# Patient Record
Sex: FEMALE | Race: WHITE | Employment: FULL TIME | URBAN - METROPOLITAN AREA
[De-identification: names, ages, dates, MRNs, and addresses within clinical notes are randomized per-mention and may not be internally consistent; named-entity substitution may affect disease eponyms.]

---

## 2022-01-20 ENCOUNTER — HOSPITAL ENCOUNTER (OUTPATIENT)
Dept: MAMMOGRAPHY | Facility: CLINIC | Age: 62
End: 2022-01-20
Attending: SPECIALIST
Payer: COMMERCIAL

## 2022-01-20 DIAGNOSIS — N64.89 SEROMA OF BREAST: ICD-10-CM

## 2022-01-20 PROCEDURE — 88305 TISSUE EXAM BY PATHOLOGIST: CPT | Mod: TC | Performed by: SPECIALIST

## 2022-01-20 PROCEDURE — 250N000009 HC RX 250: Performed by: SPECIALIST

## 2022-01-20 PROCEDURE — 10030 IMG GID FLU COLL DRG SFT TIS: CPT

## 2022-01-20 PROCEDURE — 76642 ULTRASOUND BREAST LIMITED: CPT | Mod: LT

## 2022-01-20 RX ADMIN — LIDOCAINE HYDROCHLORIDE 5 ML: 10 INJECTION, SOLUTION INFILTRATION; PERINEURAL at 14:14

## 2022-01-20 NOTE — DISCHARGE INSTRUCTIONS
"  Caring for a Closed Suction Drainage Tube  A drainage tube removes fluid from your Breast. This helps prevent infection and promotes healing. The collection bulb at the end of the tube is squeezed and plugged to create suction. The bulb should be emptied and reset when half full to maintain adequate suction. You need to empty the bulb and clean the skin around the drain as often as your healthcare provider tells you to. Follow the steps you were taught in the hospital.        Supplies  Have the following items ready:    Disposable gloves    Measuring cup    Record sheet    Gauze or paper towel    Sterile cotton swabs or 4\" x 4\" gauze pads    Sterile saline or soap and water  When to call your healthcare provider  Call your healthcare provider if you notice any of these changes:    The amount of fluid increases or decreases suddenly    Large amount of blood or a clot in drainage    Color, odor, or thickness of the fluid changes    Tube falls out or the incision opens    The stitch that holds the drain in place falls out, or is no longer attached to the drain.    Skin around the drain is red, swollen, painful, or seeping pus    You have a fever of 100.4 F ( 38 C ) or higher, or as directed by your healthcare provider  Tips  Here are tips to drain the tube:    Uncurl any kinks in the tube.    With one hand, firmly hold the base of the tube between your thumb and index finger. Don't touch the incision.    Put the thumb and index finger of your other hand on the tube, next to the first hand. Pinch your fingers together. Then pull them along the tube toward the bag. This will help push any clogged fluid through the tube. This is called \"stripping the tube.\" You may find it helpful to hold an alcohol swab between your fingers and the tube to lubricate the tubing.    If the tube still does not drain, call your healthcare provider.  [employer] last reviewed this educational content on 9/1/2019 2000-2021 [employer]. All rights reserved. This information is not intended as a substitute for professional medical care. Always follow your healthcare professional's instructions.            Discharge Instructions: Caring for Your Amadeo-Avila Drainage Tube  Your healthcare provider discharged you with a Amadeo-Avila drainage tube. They commonly leave this drain within the abdomen and other cavities after surgery. It helps drain and collect blood and body fluid after surgery. This can prevent swelling and reduces the risk for infection. The tube is held in place by a few stitches. It's covered with a bandage. Your healthcare provider will remove the drain when he or she determines you no longer need it.  Home care    Don t sleep on the same side as the tube.    Secure the tube and bag inside your clothing with a safety pin. This helps keep the tube from being pulled out.    Empty your drain at least twice a day. Empty it more often if the drain is full. Wash  and dry your hands before emptying the drain.  ? Lift the opening on the drain.  ? Drain the fluid into a measuring cup.  ? Record the amount of fluid each time you empty the drain. Include the date and time it was emptied. Share this information with your healthcare provider on your next visit.  ? Squeeze the bulb with your hands until you hear air coming out of the bulb if your healthcare provider has instructed you to do so (sometimes the bulb is used as a reservoir without suction). Check with your healthcare provider about specific drain instructions.  ? Close the opening.    If you are to change the dressing around the tube, follow the directions your provider has given you. Some dressings may not need to be changed, but your provider will let you know. Here are some general steps to follow:  ? Wash your hands.  ? Remove the old bandage.  ? Wash your hands again.  ? Clean the skin around the incision and tube site as instructed.  ? Put a new bandage on the  incision and tube site. Make the bandage large enough to cover the whole incision area.  ? Tape the bandage in place.    Keep the bandage and tube site dry when you shower.    Follow-up care  Make a follow-up appointment as directed by our staff.  When to call your healthcare provider  Call your healthcare provider right away if you have any of the following:    New or increased pain around the tube    Redness, swelling, or warmth around the incision or tube    Drainage that is foul-smelling    Vomiting    Fever of 100.4 F ( 38 C) or higher, or as directed by your provider    Fluid leaking around the tube    Incision seems not to be healing    Stitches become loose    Tube falls out or breaks    Drainage that changes from light pink to dark red    Blood clots in the drainage bulb    A sudden increase or decrease in the amount of drainage (over 30 mL)  [employer] last reviewed this educational content on 10/1/2019    9309-2383 [employer]. All rights reserved. This information is not intended as a substitute for professional medical care. Always follow your healthcare professional's instructions.

## 2022-01-24 LAB
PATH REPORT.COMMENTS IMP SPEC: NORMAL
PATH REPORT.FINAL DX SPEC: NORMAL
PATH REPORT.GROSS SPEC: NORMAL
PATH REPORT.MICROSCOPIC SPEC OTHER STN: NORMAL

## 2022-01-24 PROCEDURE — 88112 CYTOPATH CELL ENHANCE TECH: CPT | Mod: 26 | Performed by: PATHOLOGY

## 2022-01-24 PROCEDURE — 88305 TISSUE EXAM BY PATHOLOGIST: CPT | Mod: 26 | Performed by: PATHOLOGY

## 2022-02-12 ENCOUNTER — HEALTH MAINTENANCE LETTER (OUTPATIENT)
Age: 62
End: 2022-02-12

## 2022-07-15 ENCOUNTER — LAB REQUISITION (OUTPATIENT)
Dept: LAB | Facility: CLINIC | Age: 62
End: 2022-07-15
Payer: COMMERCIAL

## 2022-07-15 PROCEDURE — 88304 TISSUE EXAM BY PATHOLOGIST: CPT | Mod: TC,ORL | Performed by: STUDENT IN AN ORGANIZED HEALTH CARE EDUCATION/TRAINING PROGRAM

## 2022-07-15 PROCEDURE — 88305 TISSUE EXAM BY PATHOLOGIST: CPT | Mod: 26 | Performed by: PATHOLOGY

## 2022-07-19 LAB
PATH REPORT.COMMENTS IMP SPEC: NORMAL
PATH REPORT.COMMENTS IMP SPEC: NORMAL
PATH REPORT.FINAL DX SPEC: NORMAL
PATH REPORT.GROSS SPEC: NORMAL
PATH REPORT.MICROSCOPIC SPEC OTHER STN: NORMAL
PATH REPORT.RELEVANT HX SPEC: NORMAL
PHOTO IMAGE: NORMAL

## 2022-10-09 ENCOUNTER — HEALTH MAINTENANCE LETTER (OUTPATIENT)
Age: 62
End: 2022-10-09

## 2022-11-26 ENCOUNTER — HEALTH MAINTENANCE LETTER (OUTPATIENT)
Age: 62
End: 2022-11-26

## 2023-06-10 ENCOUNTER — OFFICE VISIT (OUTPATIENT)
Dept: URGENT CARE | Facility: URGENT CARE | Age: 63
End: 2023-06-10
Payer: COMMERCIAL

## 2023-06-10 VITALS
SYSTOLIC BLOOD PRESSURE: 116 MMHG | OXYGEN SATURATION: 97 % | RESPIRATION RATE: 20 BRPM | DIASTOLIC BLOOD PRESSURE: 72 MMHG | WEIGHT: 144.06 LBS | TEMPERATURE: 98.5 F | HEART RATE: 76 BPM

## 2023-06-10 DIAGNOSIS — R05.8 PRODUCTIVE COUGH: Primary | ICD-10-CM

## 2023-06-10 DIAGNOSIS — J98.01 BRONCHOSPASM: ICD-10-CM

## 2023-06-10 PROCEDURE — 99203 OFFICE O/P NEW LOW 30 MIN: CPT | Performed by: PHYSICIAN ASSISTANT

## 2023-06-10 RX ORDER — ALBUTEROL SULFATE 90 UG/1
AEROSOL, METERED RESPIRATORY (INHALATION)
COMMUNITY
Start: 2023-06-04

## 2023-06-10 RX ORDER — CLONIDINE HYDROCHLORIDE 0.1 MG/1
TABLET ORAL
COMMUNITY
Start: 2022-07-11

## 2023-06-10 RX ORDER — METOPROLOL SUCCINATE 50 MG/1
TABLET, EXTENDED RELEASE ORAL
COMMUNITY
Start: 2023-06-04

## 2023-06-10 RX ORDER — LEVOCETIRIZINE DIHYDROCHLORIDE 5 MG/1
TABLET, FILM COATED ORAL
COMMUNITY
Start: 2023-06-04

## 2023-06-10 RX ORDER — PREDNISONE 20 MG/1
40 TABLET ORAL DAILY
Qty: 10 TABLET | Refills: 0 | Status: SHIPPED | OUTPATIENT
Start: 2023-06-10 | End: 2023-06-15

## 2023-06-10 RX ORDER — AZITHROMYCIN 250 MG/1
TABLET, FILM COATED ORAL
Qty: 6 TABLET | Refills: 0 | Status: SHIPPED | OUTPATIENT
Start: 2023-06-10 | End: 2023-06-15

## 2023-06-10 RX ORDER — LOSARTAN POTASSIUM 100 MG/1
TABLET ORAL
COMMUNITY
Start: 2023-06-04

## 2023-06-10 RX ORDER — AMLODIPINE BESYLATE 5 MG/1
TABLET ORAL
COMMUNITY
Start: 2023-06-04

## 2023-06-10 NOTE — PROGRESS NOTES
Assessment & Plan     Productive cough   We have elected to treat for presumed bacterial bronchitis given length and worsening symptoms.  Zithromax is prescribed today.  OTC cough medication as needed.  Advised to keep monitoring symptoms.  Follow-up if any worsening symptoms.  Patient agrees with the plan.  - azithromycin (ZITHROMAX) 250 MG tablet  Dispense: 6 tablet; Refill: 0    Bronchospasm  Moderate wheezing noted on exam today.  Patient is in no acute respiratory distress.  Vitals are stable.  Prednisone is prescribed to help with bronchospasms.  Continue albuterol inhaler as needed.  Follow-up if any worsening symptoms.  Patient agrees with the plan.  - predniSONE (DELTASONE) 20 MG tablet  Dispense: 10 tablet; Refill: 0       Return in about 10 days (around 6/20/2023) for Symptoms failing to improve.    Shira John PA-C  [facility] is a 63 year old female who presents to clinic today for the following health issues:  Chief Complaint   Patient presents with     Cough     2 weeks nasal congestion, wheezing, sob     HPI      URI Adult    Onset of symptoms was 2 week(s) ago.  Course of illness is worsening.    Severity moderate  Current and Associated symptoms: cough - productive, wheezing and shortness of breath  Treatment measures tried include albuterol inhaler, antihistamine.  Predisposing factors include None.  No fever.      Review of Systems  Constitutional, HEENT, cardiovascular, pulmonary, GI, , musculoskeletal, neuro, skin, endocrine and psych systems are negative, except as otherwise noted.      Objective    /72   Pulse 76   Temp 98.5  F (36.9  C)   Resp 20   Wt 65.3 kg (144 lb 1 oz)   SpO2 97%   Physical Exam   GENERAL: healthy, alert and no distress  HENT: ear canals and TM's normal, mouth without ulcers or lesions  RESP: lungs with expiratory wheezing over the lung fields, coarse breath sounds  CV: regular rate and rhythm,  normal S1 S2  MS: no gross musculoskeletal defects noted, no edema  SKIN: no suspicious lesions or rashes

## 2024-03-16 ENCOUNTER — HEALTH MAINTENANCE LETTER (OUTPATIENT)
Age: 64
End: 2024-03-16

## 2024-08-03 ENCOUNTER — HEALTH MAINTENANCE LETTER (OUTPATIENT)
Age: 64
End: 2024-08-03

## 2025-08-16 ENCOUNTER — HEALTH MAINTENANCE LETTER (OUTPATIENT)
Age: 65
End: 2025-08-16